# Patient Record
Sex: FEMALE | Race: WHITE
[De-identification: names, ages, dates, MRNs, and addresses within clinical notes are randomized per-mention and may not be internally consistent; named-entity substitution may affect disease eponyms.]

---

## 2020-01-06 ENCOUNTER — HOSPITAL ENCOUNTER (OUTPATIENT)
Dept: HOSPITAL 92 - SCSMRI | Age: 55
Discharge: HOME | End: 2020-01-06
Attending: PHYSICIAN ASSISTANT
Payer: COMMERCIAL

## 2020-01-06 DIAGNOSIS — M25.519: ICD-10-CM

## 2020-01-06 DIAGNOSIS — M46.92: ICD-10-CM

## 2020-01-06 DIAGNOSIS — J32.3: ICD-10-CM

## 2020-01-06 DIAGNOSIS — M54.2: Primary | ICD-10-CM

## 2020-01-06 DIAGNOSIS — M48.02: ICD-10-CM

## 2020-01-06 PROCEDURE — 72141 MRI NECK SPINE W/O DYE: CPT

## 2020-01-06 PROCEDURE — 72040 X-RAY EXAM NECK SPINE 2-3 VW: CPT

## 2020-01-06 NOTE — MRI
MRI CERVICAL SPINE WITHOUT CONTRAST:



HISTORY:

Neck pain. Shoulder pain. Pain radiates down into the right shoulder. Fall in October.



COMPARISON: 

None.



FINDINGS:

Appropriate T1 marrow signal intensity of the cervical vertebrae. Cervical spine vertebral body heigh
t is maintained. No fracture. No significant STIR hyperintensity to suggest vertebral body edema or

ligamentous injury.



Visualized brain parenchyma, cervical medullary junction, cervical cord and the upper thoracic cord h
ave a normal size and signal intensity

There is significant opacification of the visualized sphenoid sinuses. Incidental partially empty cookie
la is noted.



C2-C3: No significant central canal stenosis or significant neural foraminal narrowing. Small synovia
l cyst is suspected adjacent to the right C2-C3 facet joint.



C3-C4: Broad-based disc-osteophyte complex without significant central canal stenosis. Moderate to se
cristal right neural foraminal narrowing due to uncovertebral and facet hypertrophy. Patent left

neural foramen.



C4-C5: Central disc protrusion abuts the thecal sac. Subarachnoid space is maintained. Mild central c
anal stenosis. Minimal bilateral foraminal narrowing due to uncovertebral hypertrophy.



C5-C6: Broad-based disc-osteophyte complex nearly effaces the subarachnoid space. Minimal mass effect
 upon the left and right aspects of the cervical cord, without cord hyperintensity. Mild right and

minimal left foraminal narrowing due to uncovertebral hypertrophy.



C6-C7: Broad-based disc-osteophyte complex abuts the thecal sac. Subarachnoid space is nearly effaced
. Mild central canal stenosis. Mild bilateral foraminal narrowing due to uncovertebral hypertrophy.



C7-T1: No significant central canal stenosis or significant neural foraminal narrowing.



IMPRESSION:

 1. Sphenoid sinus disease, incompletely evaluated.

2. Varying degrees of central canal stenosis and neural foraminal narrowing as detailed above.



Transcribed Date/Time: 1/6/2020 9:56 AM



Reported By: Tess Campbell 

Electronically Signed:  1/6/2020 11:48 AM

## 2020-01-06 NOTE — RAD
CERVICAL SPINE SERIES 2 VIEWS FLEXION AND EXTENSION:

 

Date:  01/06/2020

 

HISTORY:  

Neck pain, fall in October. Pain radiation to right shoulder. 

 

FINDINGS:

Vertebral bodies are normal in height. There is some mild to moderate disc narrowing at C5-6 and more
 pronounced disc narrowing at the C6-7 level. No abnormal motion on the flexion and extension views. 
Degenerative facet changes are noted. 

 

IMPRESSION: 

Arthritic changes of the lower cervical spine. 

 

 

POS: LINDY

## 2020-06-19 ENCOUNTER — HOSPITAL ENCOUNTER (OUTPATIENT)
Dept: HOSPITAL 92 - LABBT | Age: 55
Discharge: HOME | End: 2020-06-19
Attending: NEUROLOGICAL SURGERY
Payer: COMMERCIAL

## 2020-06-19 DIAGNOSIS — Z01.812: Primary | ICD-10-CM

## 2020-06-19 DIAGNOSIS — M54.12: ICD-10-CM

## 2020-06-19 DIAGNOSIS — Z11.59: ICD-10-CM

## 2020-06-19 DIAGNOSIS — M48.02: ICD-10-CM

## 2020-06-19 LAB
ANION GAP SERPL CALC-SCNC: 13 MMOL/L (ref 10–20)
APTT PPP: 30.6 SEC (ref 22.9–36.1)
BUN SERPL-MCNC: 32 MG/DL (ref 9.8–20.1)
CALCIUM SERPL-MCNC: 9.4 MG/DL (ref 7.8–10.44)
CHLORIDE SERPL-SCNC: 105 MMOL/L (ref 98–107)
CO2 SERPL-SCNC: 26 MMOL/L (ref 22–29)
CREAT CL PREDICTED SERPL C-G-VRATE: 0 ML/MIN (ref 70–130)
GLUCOSE SERPL-MCNC: 86 MG/DL (ref 70–105)
HGB BLD-MCNC: 13.7 G/DL (ref 12–16)
INR PPP: 1
MCH RBC QN AUTO: 29.6 PG (ref 27–31)
MCV RBC AUTO: 90.6 FL (ref 78–98)
PLATELET # BLD AUTO: 317 THOU/UL (ref 130–400)
POTASSIUM SERPL-SCNC: 4.3 MMOL/L (ref 3.5–5.1)
PROTHROMBIN TIME: 12.8 SEC (ref 12–14.7)
RBC # BLD AUTO: 4.63 MILL/UL (ref 4.2–5.4)
SODIUM SERPL-SCNC: 140 MMOL/L (ref 136–145)
WBC # BLD AUTO: 5.4 THOU/UL (ref 4.8–10.8)

## 2020-06-19 PROCEDURE — 85730 THROMBOPLASTIN TIME PARTIAL: CPT

## 2020-06-19 PROCEDURE — U0003 INFECTIOUS AGENT DETECTION BY NUCLEIC ACID (DNA OR RNA); SEVERE ACUTE RESPIRATORY SYNDROME CORONAVIRUS 2 (SARS-COV-2) (CORONAVIRUS DISEASE [COVID-19]), AMPLIFIED PROBE TECHNIQUE, MAKING USE OF HIGH THROUGHPUT TECHNOLOGIES AS DESCRIBED BY CMS-2020-01-R: HCPCS

## 2020-06-19 PROCEDURE — 87635 SARS-COV-2 COVID-19 AMP PRB: CPT

## 2020-06-19 PROCEDURE — 85027 COMPLETE CBC AUTOMATED: CPT

## 2020-06-19 PROCEDURE — 80048 BASIC METABOLIC PNL TOTAL CA: CPT

## 2020-06-19 PROCEDURE — 85610 PROTHROMBIN TIME: CPT

## 2020-06-23 ENCOUNTER — HOSPITAL ENCOUNTER (OUTPATIENT)
Dept: HOSPITAL 92 - SDC | Age: 55
LOS: 1 days | Discharge: HOME | End: 2020-06-24
Attending: NEUROLOGICAL SURGERY
Payer: COMMERCIAL

## 2020-06-23 VITALS — BODY MASS INDEX: 30.6 KG/M2

## 2020-06-23 DIAGNOSIS — M54.12: Primary | ICD-10-CM

## 2020-06-23 DIAGNOSIS — M48.02: ICD-10-CM

## 2020-06-23 DIAGNOSIS — Z79.899: ICD-10-CM

## 2020-06-23 DIAGNOSIS — Z88.5: ICD-10-CM

## 2020-06-23 PROCEDURE — C1776 JOINT DEVICE (IMPLANTABLE): HCPCS

## 2020-06-23 PROCEDURE — 76000 FLUOROSCOPY <1 HR PHYS/QHP: CPT

## 2020-06-23 PROCEDURE — L0174 CERV SR 2PC THOR EXT PRE OTS: HCPCS

## 2020-06-23 PROCEDURE — 0RG10A0 FUSION OF CERVICAL VERTEBRAL JOINT WITH INTERBODY FUSION DEVICE, ANTERIOR APPROACH, ANTERIOR COLUMN, OPEN APPROACH: ICD-10-PCS | Performed by: NEUROLOGICAL SURGERY

## 2020-06-23 PROCEDURE — 0RT30ZZ RESECTION OF CERVICAL VERTEBRAL DISC, OPEN APPROACH: ICD-10-PCS | Performed by: NEUROLOGICAL SURGERY

## 2020-06-23 RX ADMIN — HYDROCODONE BITARTRATE AND ACETAMINOPHEN PRN TAB: 7.5; 325 TABLET ORAL at 17:33

## 2020-06-23 RX ADMIN — CEFAZOLIN SODIUM SCH: 2 SOLUTION INTRAVENOUS at 16:56

## 2020-06-23 RX ADMIN — ACETAMINOPHEN AND CODEINE PHOSPHATE PRN TAB: 300; 30 TABLET ORAL at 21:01

## 2020-06-24 VITALS — DIASTOLIC BLOOD PRESSURE: 79 MMHG | TEMPERATURE: 98.4 F | SYSTOLIC BLOOD PRESSURE: 122 MMHG

## 2020-06-24 RX ADMIN — CEFAZOLIN SODIUM SCH: 2 SOLUTION INTRAVENOUS at 09:06

## 2020-06-24 RX ADMIN — HYDROCODONE BITARTRATE AND ACETAMINOPHEN PRN TAB: 7.5; 325 TABLET ORAL at 09:06

## 2020-06-24 RX ADMIN — HYDROCODONE BITARTRATE AND ACETAMINOPHEN PRN TAB: 7.5; 325 TABLET ORAL at 00:28

## 2020-06-24 RX ADMIN — ACETAMINOPHEN AND CODEINE PHOSPHATE PRN TAB: 300; 30 TABLET ORAL at 00:27

## 2020-06-24 RX ADMIN — CEFAZOLIN SODIUM SCH MLS: 2 SOLUTION INTRAVENOUS at 00:18

## 2020-06-24 NOTE — OP
DATE OF PROCEDURE:  06/23/2020



LOCATION:  OR 12.



ASSISTANT:  Lauren Waldron PA-C



PREPROCEDURE DIAGNOSIS:  Neck and right shoulder pain with right C6 radiculopathy,

failed conservative management. 



POSTPROCEDURE DIAGNOSIS:  Neck and right shoulder pain with right C6 radiculopathy,

failed conservative management. 



PROCEDURES PERFORMED:  

1. Anterior C5-C6 diskectomy for decompression of spinal cord nerve roots.

2. Placement of interbody spacer packed with local bone autograft obtained with same

incision and allograft C5-C6 for arthrodesis. 

3. Anterior cervical plate and screw fixation, C5-C6.

4. Use of operating microscope for microdissection.



DESCRIPTION OF PROCEDURE:  After informed consent was obtained from the patient, she

was brought to the operating room.  Proper patient, pause, and identification were

carried out.  Following general anesthesia, a transverse madiha was made in the right

neck.  This area was sterilely cleansed, prepared and draped.  Proper patient,

pause, and identification were carried out.  The wound was then opened with a

combination of sharp, monopolar, and blunt dissection.  We proceeded medial to the

right carotid sheath and lateral to the laryngeal and pharyngeal bundle.  We

proceeded to the prevertebral layer of deep cervical fascia and the longus colli

muscles were swept laterally.  We then identified the C5-C6 segment.  We then

brought distraction pins and distracted the segment.  The microscope was brought in

for microdissection.  The C5-C6 diskectomy was performed with excellent

decompression and common dural tube and nerve roots.  We then placed an interbody

spacer in appropriate dimension, packed 

with graft for arthrodesis initiation.  We then placed a C5-C6 ACDF plate and the

screws were placed and locked in.  Copious irrigation occurred throughout as

maximizing 

hemostasis.  The wound was then closed in anatomic layers over drain.  The patient

emerged from anesthesia. 







Job ID:  [id number]

## 2020-06-24 NOTE — PRG
DATE OF SERVICE:  06/24/2020



Ms. Uribe is postoperative day 1 from C5-6 ACDF.  She has interscapular neck pain,

but otherwise no signs of radiculopathy.  Very pleased with how she is doing.  Her

drain output has been minimal.  We will remove it and send her home.  We went over

intra and postoperative issues. 







Job ID:  [id number]

## 2020-08-03 ENCOUNTER — HOSPITAL ENCOUNTER (OUTPATIENT)
Dept: HOSPITAL 92 - SCSRAD | Age: 55
Discharge: HOME | End: 2020-08-03
Attending: PHYSICIAN ASSISTANT
Payer: COMMERCIAL

## 2020-08-03 DIAGNOSIS — M50.00: Primary | ICD-10-CM

## 2020-08-03 DIAGNOSIS — M47.812: ICD-10-CM

## 2020-08-03 DIAGNOSIS — Z98.1: ICD-10-CM

## 2020-08-03 DIAGNOSIS — M79.89: ICD-10-CM

## 2020-08-03 PROCEDURE — 72040 X-RAY EXAM NECK SPINE 2-3 VW: CPT

## 2020-08-03 NOTE — RAD
CERVICAL SPINE 3 VIEWS:

 

HISTORY: 

Neck pain, radiculopathy.

 

FINDINGS: 

Status post anterior cervical fusion changes at C5-C6.  No significant prevertebral soft tissue swell
ing.  Generalized spondylosis.  

 

IMPRESSION: 

Anterior cervical fusion changes at C5-C6 without malalignment or prevertebral soft tissue swelling. 
 Generalized spondylosis.

 

POS: OFF